# Patient Record
Sex: MALE | URBAN - METROPOLITAN AREA
[De-identification: names, ages, dates, MRNs, and addresses within clinical notes are randomized per-mention and may not be internally consistent; named-entity substitution may affect disease eponyms.]

---

## 2023-08-01 ENCOUNTER — OFFICE (OUTPATIENT)
Dept: URBAN - METROPOLITAN AREA CLINIC 112 | Facility: CLINIC | Age: 28
Setting detail: OPHTHALMOLOGY
End: 2023-08-01
Payer: MEDICAID

## 2023-08-01 DIAGNOSIS — H02.403: ICD-10-CM

## 2023-08-01 DIAGNOSIS — H52.223: ICD-10-CM

## 2023-08-01 DIAGNOSIS — H04.123: ICD-10-CM

## 2023-08-01 DIAGNOSIS — H52.13: ICD-10-CM

## 2023-08-01 PROBLEM — H04.122 DRY EYE SYNDROME LACRIMAL GLAND; RIGHT EYE, LEFT EYE, BOTH EYES: Status: ACTIVE | Noted: 2023-08-01

## 2023-08-01 PROBLEM — H04.121 DRY EYE SYNDROME LACRIMAL GLAND; RIGHT EYE, LEFT EYE, BOTH EYES: Status: ACTIVE | Noted: 2023-08-01

## 2023-08-01 PROCEDURE — 92015 DETERMINE REFRACTIVE STATE: CPT | Performed by: OPHTHALMOLOGY

## 2023-08-01 PROCEDURE — 92004 COMPRE OPH EXAM NEW PT 1/>: CPT | Performed by: OPHTHALMOLOGY

## 2023-08-01 ASSESSMENT — CONFRONTATIONAL VISUAL FIELD TEST (CVF)
OS_FINDINGS: FULL
OD_FINDINGS: FULL

## 2023-08-01 ASSESSMENT — REFRACTION_CURRENTRX
OD_VPRISM_DIRECTION: SV
OS_VPRISM_DIRECTION: SV
OS_CYLINDER: -0.50
OS_SPHERE: -0.75
OD_CYLINDER: -1.25
OD_OVR_VA: 20/
OD_AXIS: 004
OS_OVR_VA: 20/
OD_SPHERE: -0.50
OS_AXIS: 142

## 2023-08-01 ASSESSMENT — REFRACTION_AUTOREFRACTION
OS_SPHERE: -1.75
OS_AXIS: 159
OS_CYLINDER: -1.00
OD_SPHERE: -1.00
OD_CYLINDER: -1.50
OD_AXIS: 005

## 2023-08-01 ASSESSMENT — REFRACTION_MANIFEST
OS_AXIS: 160
OU_VA: 20/20
OD_AXIS: 005
OS_VA1: 20/20
OD_SPHERE: -0.75
OS_SPHERE: -1.50
OD_CYLINDER: -1.50
OD_VA1: 20/20
OS_CYLINDER: -1.00

## 2023-08-01 ASSESSMENT — VISUAL ACUITY
OS_BCVA: 20/20
OD_BCVA: 20/25+

## 2023-08-01 ASSESSMENT — SPHEQUIV_DERIVED
OD_SPHEQUIV: -1.75
OS_SPHEQUIV: -2
OD_SPHEQUIV: -1.5
OS_SPHEQUIV: -2.25

## 2023-08-01 ASSESSMENT — AXIALLENGTH_DERIVED
OD_AL: 25.4081
OS_AL: 25.636
OS_AL: 25.7516
OD_AL: 25.5215

## 2023-08-01 ASSESSMENT — KERATOMETRY
OS_K1POWER_DIOPTERS: 40.00
OD_K2POWER_DIOPTERS: 41.25
OD_K1POWER_DIOPTERS: 39.75
OD_AXISANGLE_DEGREES: 102
OS_K2POWER_DIOPTERS: 41.00
OS_AXISANGLE_DEGREES: 067

## 2023-08-01 ASSESSMENT — LID POSITION - PTOSIS
OS_PTOSIS: LUL T
OD_PTOSIS: RUL T

## 2023-08-15 ENCOUNTER — OUTPATIENT (OUTPATIENT)
Dept: OUTPATIENT SERVICES | Facility: HOSPITAL | Age: 28
LOS: 1 days | End: 2023-08-15

## 2023-08-15 VITALS
TEMPERATURE: 98 F | DIASTOLIC BLOOD PRESSURE: 79 MMHG | HEART RATE: 98 BPM | HEIGHT: 70.5 IN | OXYGEN SATURATION: 98 % | WEIGHT: 195.99 LBS | SYSTOLIC BLOOD PRESSURE: 113 MMHG | RESPIRATION RATE: 18 BRPM

## 2023-08-15 DIAGNOSIS — F20.9 SCHIZOPHRENIA, UNSPECIFIED: ICD-10-CM

## 2023-08-15 DIAGNOSIS — K05.6 PERIODONTAL DISEASE, UNSPECIFIED: ICD-10-CM

## 2023-08-15 DIAGNOSIS — F84.0 AUTISTIC DISORDER: ICD-10-CM

## 2023-08-15 DIAGNOSIS — Z98.890 OTHER SPECIFIED POSTPROCEDURAL STATES: Chronic | ICD-10-CM

## 2023-08-15 LAB
ANION GAP SERPL CALC-SCNC: 10 MMOL/L — SIGNIFICANT CHANGE UP (ref 7–14)
BUN SERPL-MCNC: 17 MG/DL — SIGNIFICANT CHANGE UP (ref 7–23)
CALCIUM SERPL-MCNC: 8.7 MG/DL — SIGNIFICANT CHANGE UP (ref 8.4–10.5)
CHLORIDE SERPL-SCNC: 105 MMOL/L — SIGNIFICANT CHANGE UP (ref 98–107)
CO2 SERPL-SCNC: 25 MMOL/L — SIGNIFICANT CHANGE UP (ref 22–31)
CREAT SERPL-MCNC: 1.2 MG/DL — SIGNIFICANT CHANGE UP (ref 0.5–1.3)
EGFR: 84 ML/MIN/1.73M2 — SIGNIFICANT CHANGE UP
GLUCOSE SERPL-MCNC: 77 MG/DL — SIGNIFICANT CHANGE UP (ref 70–99)
HCT VFR BLD CALC: 49.9 % — SIGNIFICANT CHANGE UP (ref 39–50)
HGB BLD-MCNC: 16.6 G/DL — SIGNIFICANT CHANGE UP (ref 13–17)
MCHC RBC-ENTMCNC: 31.5 PG — SIGNIFICANT CHANGE UP (ref 27–34)
MCHC RBC-ENTMCNC: 33.3 GM/DL — SIGNIFICANT CHANGE UP (ref 32–36)
MCV RBC AUTO: 94.7 FL — SIGNIFICANT CHANGE UP (ref 80–100)
NRBC # BLD: 0 /100 WBCS — SIGNIFICANT CHANGE UP (ref 0–0)
NRBC # FLD: 0 K/UL — SIGNIFICANT CHANGE UP (ref 0–0)
PLATELET # BLD AUTO: 170 K/UL — SIGNIFICANT CHANGE UP (ref 150–400)
POTASSIUM SERPL-MCNC: 4.2 MMOL/L — SIGNIFICANT CHANGE UP (ref 3.5–5.3)
POTASSIUM SERPL-SCNC: 4.2 MMOL/L — SIGNIFICANT CHANGE UP (ref 3.5–5.3)
RBC # BLD: 5.27 M/UL — SIGNIFICANT CHANGE UP (ref 4.2–5.8)
RBC # FLD: 12.7 % — SIGNIFICANT CHANGE UP (ref 10.3–14.5)
SODIUM SERPL-SCNC: 140 MMOL/L — SIGNIFICANT CHANGE UP (ref 135–145)
WBC # BLD: 7.91 K/UL — SIGNIFICANT CHANGE UP (ref 3.8–10.5)
WBC # FLD AUTO: 7.91 K/UL — SIGNIFICANT CHANGE UP (ref 3.8–10.5)

## 2023-08-15 NOTE — H&P PST ADULT - PROBLEM SELECTOR PLAN 1
Patient tentatively scheduled for Comprehensive dental treatment on 8/17/23.  Pre-op instructions provided. Pt's  given verbal and written instructions with pepcid. Verbalized understanding.   CBC, BMP were done at [facility].  Copy of Medical evaluation in chart.     Janenelaurie Richter will consent for procedure- [phone]

## 2023-08-15 NOTE — H&P PST ADULT - HISTORY OF PRESENT ILLNESS
28 year old male with pmhx of Autism, Schizophrenia, PTSD, Asthma presents for pre-op evaluation for diagnosis of Periodontal disease unspecified. Pt is scheduled for Comprehensive dental treatment.

## 2023-08-15 NOTE — H&P PST ADULT - NEGATIVE LYMPHATIC SYMPTOMS
no enlarged lymph nodes/no tender lymph nodes/no swelling of extremity Sotyktu Pregnancy And Lactation Text: There is insufficient data to evaluate whether or not Sotyktu is safe to use during pregnancy.   It is not known if Sotyktu passes into breast milk and whether or not it is safe to use when breastfeeding.

## 2023-08-15 NOTE — H&P PST ADULT - NEGATIVE ENMT SYMPTOMS
Denies dentures. Denies loose teeth./no hearing difficulty/no ear pain/no vertigo/no sinus symptoms/no nasal congestion/no nose bleeds/no dry mouth/no throat pain/no dysphagia

## 2023-08-15 NOTE — H&P PST ADULT - NSICDXPASTMEDICALHX_GEN_ALL_CORE_FT
PAST MEDICAL HISTORY:  Asthma     Astigmatism     Autism     H/O myopia     Periodontal disease, unspecified     Post traumatic stress disorder     Schizophrenia     Tardive dyskinesia

## 2023-08-15 NOTE — H&P PST ADULT - NEGATIVE PSYCHIATRIC SYMPTOMS
no suicidal ideation/no depression/no anxiety/no insomnia/no mood swings/no visual hallucinations/no auditory hallucinations/no hyperactivity

## 2023-08-15 NOTE — H&P PST ADULT - NEGATIVE GENERAL GENITOURINARY SYMPTOMS
no hematuria/no flank pain L/no flank pain R/no incontinence/no dysuria/normal urinary frequency/no nocturia

## 2023-08-15 NOTE — H&P PST ADULT - NSANTHOSAYNRD_GEN_A_CORE
No. TESSIE screening performed.  STOP BANG Legend: 0-2 = LOW Risk; 3-4 = INTERMEDIATE Risk; 5-8 = HIGH Risk

## 2023-11-19 PROBLEM — K05.6 PERIODONTAL DISEASE, UNSPECIFIED: Chronic | Status: ACTIVE | Noted: 2023-08-15

## 2023-11-19 PROBLEM — G24.01 DRUG INDUCED SUBACUTE DYSKINESIA: Chronic | Status: ACTIVE | Noted: 2023-08-15

## 2023-11-19 PROBLEM — F20.9 SCHIZOPHRENIA, UNSPECIFIED: Chronic | Status: ACTIVE | Noted: 2023-08-15

## 2023-11-19 PROBLEM — Z86.69 PERSONAL HISTORY OF OTHER DISEASES OF THE NERVOUS SYSTEM AND SENSE ORGANS: Chronic | Status: ACTIVE | Noted: 2023-08-15

## 2023-11-19 PROBLEM — F43.10 POST-TRAUMATIC STRESS DISORDER, UNSPECIFIED: Chronic | Status: ACTIVE | Noted: 2023-08-15

## 2023-11-19 PROBLEM — H52.209 UNSPECIFIED ASTIGMATISM, UNSPECIFIED EYE: Chronic | Status: ACTIVE | Noted: 2023-08-15

## 2023-11-19 PROBLEM — J45.909 UNSPECIFIED ASTHMA, UNCOMPLICATED: Chronic | Status: ACTIVE | Noted: 2023-08-15

## 2023-11-19 PROBLEM — F84.0 AUTISTIC DISORDER: Chronic | Status: ACTIVE | Noted: 2023-08-15

## 2024-01-25 ENCOUNTER — OUTPATIENT (OUTPATIENT)
Dept: OUTPATIENT SERVICES | Facility: HOSPITAL | Age: 29
LOS: 1 days | End: 2024-01-25

## 2024-01-25 VITALS
SYSTOLIC BLOOD PRESSURE: 122 MMHG | RESPIRATION RATE: 16 BRPM | OXYGEN SATURATION: 98 % | DIASTOLIC BLOOD PRESSURE: 85 MMHG | HEART RATE: 98 BPM | TEMPERATURE: 98 F | WEIGHT: 192.02 LBS | HEIGHT: 70 IN

## 2024-01-25 DIAGNOSIS — K02.62 DENTAL CARIES ON SMOOTH SURFACE PENETRATING INTO DENTIN: ICD-10-CM

## 2024-01-25 DIAGNOSIS — Z98.890 OTHER SPECIFIED POSTPROCEDURAL STATES: Chronic | ICD-10-CM

## 2024-01-25 NOTE — H&P PST ADULT - FUNCTIONAL STATUS
DASI METS 9.89 able dance, play basetball, soccer/4-10 METS DASI METS 9.89 able dance, play basketball, soccer

## 2024-01-25 NOTE — H&P PST ADULT - PROBLEM SELECTOR PLAN 2
Instructed for patient to take Clozapine and Divalproex- DR with a sip of water on the morning of procedure.

## 2024-01-25 NOTE — H&P PST ADULT - HEIGHT IN INCHES
Carac Counseling:  I discussed with the patient the risks of Carac including but not limited to erythema, scaling, itching, weeping, crusting, and pain. 10

## 2024-01-25 NOTE — H&P PST ADULT - HISTORY OF PRESENT ILLNESS
29 year old male with pmhx of Autism, Schizophrenia, PTSD, Asthma presents for pre-op evaluation for diagnosis of Periodontal disease unspecified. Pt is scheduled for Comprehensive dental treatment.

## 2024-01-25 NOTE — H&P PST ADULT - NSCAFFEAMTFREQ_GEN_ALL_CORE_SD
Message  Return to work or school:   Wilfrido Zaman is under my professional care  She was seen in my office on 4/3/17     She is able to return to school on 4/4/17     [facility] CRNP  Signatures   Electronically signed by : KELL PATHAK;  Apr  3 2017  8:35AM EST                       (Author)    Electronically signed by : Mandeep Bearden MD; May 30 2017  7:53PM EST                       (Author)
occasional use

## 2024-01-25 NOTE — H&P PST ADULT - PROBLEM SELECTOR PLAN 1
Patient tentatively scheduled for Comprehensive dental treatment on 8/17/23.  Pre-op instructions provided. Pt's  given verbal and written instructions with pepcid. Verbalized understanding.   CBC, BMP were done at [facility].  Copy of Medical evaluation in chart.     Janenelaurie Richter will consent for procedure- [phone] Patient tentatively scheduled for Comprehensive dental treatment on 2/02/2024   Pre-op instructions provided. Pt's  given verbal and written instructions with pepcid. Verbalized understanding.   Recent labs in chart   Copy of Medical evaluation in chart.    Consent by [employer] (Legal document in chart)

## 2024-01-25 NOTE — H&P PST ADULT - ENMT COMMENTS
pre-op dx: Periodontal disease unspecified. pre-op dx: Periodontal disease unspecified, poor dentition

## 2024-01-25 NOTE — H&P PST ADULT - NS PRO AD NO ADVANCE DIRECTIVE
----- Message from Kayla Melo MD sent at 2/10/2023  1:38 PM CST -----  Please schedule RCS and salpingectomy with Dr Benavides on 3/23.  Please check with Dr Benavides first   No

## 2024-01-25 NOTE — H&P PST ADULT - NEGATIVE ENMT SYMPTOMS
Pt. Denies dentures & Denies loose teeth./no hearing difficulty/no ear pain/no vertigo/no sinus symptoms/no nasal congestion/no nose bleeds/no dry mouth/no throat pain/no dysphagia

## 2024-01-25 NOTE — H&P PST ADULT - NEGATIVE SKIN SYMPTOMS
Pt discharged home per provider in stable condition. Paperwork provided to pt via RN and discharge instructions went over with by RN - pt verbalized understanding of teaching with no questions or concerns and is A/Ox4, VSS. Paperwork in hand on discharge.    Paperwork given to pt and gone over with by this RN - no new prescriptions. Pt verbalized understanding of teaching with no questions or concerns. Paperwork in hand on discharge. Ambulatory out with all belongings in hand accompanied by boyfriend.   no rash/no itching/no dryness

## 2024-02-01 NOTE — ASU PATIENT PROFILE, ADULT - FALL HARM RISK - UNIVERSAL INTERVENTIONS
Bed in lowest position, wheels locked, appropriate side rails in place/Call bell, personal items and telephone in reach/Instruct patient to call for assistance before getting out of bed or chair/Non-slip footwear when patient is out of bed/[place] to call system/Physically safe environment - no spills, clutter or unnecessary equipment/Purposeful Proactive Rounding/Room/bathroom lighting operational, light cord in reach

## 2024-02-02 ENCOUNTER — TRANSCRIPTION ENCOUNTER (OUTPATIENT)
Age: 29
End: 2024-02-02

## 2024-02-02 ENCOUNTER — OUTPATIENT (OUTPATIENT)
Dept: OUTPATIENT SERVICES | Facility: HOSPITAL | Age: 29
LOS: 1 days | Discharge: HOME | End: 2024-02-02

## 2024-02-02 VITALS
SYSTOLIC BLOOD PRESSURE: 139 MMHG | OXYGEN SATURATION: 98 % | RESPIRATION RATE: 18 BRPM | HEART RATE: 83 BPM | DIASTOLIC BLOOD PRESSURE: 96 MMHG

## 2024-02-02 VITALS
TEMPERATURE: 98 F | RESPIRATION RATE: 14 BRPM | OXYGEN SATURATION: 99 % | HEIGHT: 70 IN | SYSTOLIC BLOOD PRESSURE: 125 MMHG | WEIGHT: 192.02 LBS | HEART RATE: 84 BPM | DIASTOLIC BLOOD PRESSURE: 78 MMHG

## 2024-02-02 DIAGNOSIS — Z98.890 OTHER SPECIFIED POSTPROCEDURAL STATES: Chronic | ICD-10-CM

## 2024-02-02 DIAGNOSIS — K02.62 DENTAL CARIES ON SMOOTH SURFACE PENETRATING INTO DENTIN: ICD-10-CM

## 2024-02-02 DEVICE — SURGICEL 2 X 14": Type: IMPLANTABLE DEVICE | Status: FUNCTIONAL

## 2024-02-02 RX ORDER — FENTANYL CITRATE 50 UG/ML
50 INJECTION INTRAVENOUS ONCE
Refills: 0 | Status: DISCONTINUED | OUTPATIENT
Start: 2024-02-02 | End: 2024-02-02

## 2024-02-02 RX ORDER — FENTANYL CITRATE 50 UG/ML
25 INJECTION INTRAVENOUS
Refills: 0 | Status: DISCONTINUED | OUTPATIENT
Start: 2024-02-02 | End: 2024-02-02

## 2024-02-02 RX ORDER — ONDANSETRON 8 MG/1
4 TABLET, FILM COATED ORAL ONCE
Refills: 0 | Status: DISCONTINUED | OUTPATIENT
Start: 2024-02-02 | End: 2024-02-16

## 2024-02-02 NOTE — ASU DISCHARGE PLAN (ADULT/PEDIATRIC) - NURSING INSTRUCTIONS
DO NOT take any Tylenol (Acetaminophen) or narcotics containing Tylenol until after  9:23 pm_ . You received Tylenol during your operation and it can cause damage to your liver if too much is taken within a 24 hour time period. DO NOT take any Ibuprofen ,Advil or Aleeve until after  9:40 pm_ . You received Toradol during your operation and it can cause damage if too much is taken within a 24 hour time period.

## 2024-02-02 NOTE — ASU PREOP CHECKLIST - MEDICAL/PEDIATRIC CLEARANCE ON MEDICAL RECORD
schizophrenia, PTSD, autism  decision making committee paper in chart schizophrenia, PTSD, autism       decision making committee paper in chart             from group home

## 2024-02-02 NOTE — ASU DISCHARGE PLAN (ADULT/PEDIATRIC) - NS DC INTERPRETER YES NO
Problem: Patient Care Overview  Goal: Plan of Care/Patient Progress Review  Outcome: Therapy, progress towards functional goals is fair  VSS. Pain managed with oxycotin, oxycodone q4, and dilaudid for breakthrough pain. +Nauzea, scheduled zofran given. NPO, scrub #1 to be completed this shift. Voiding spontaneously, pt independent with ostomy cares. Pt up ad juan. Continue current plan of care.        No

## 2024-02-02 NOTE — ASU DISCHARGE PLAN (ADULT/PEDIATRIC) - NS MD DC FALL RISK RISK
For information on Fall & Injury Prevention, visit: [email or web address] OR  [email or web address] OR  https://www.cdc.gov/steadi/patient.html

## 2024-04-25 ENCOUNTER — OUTPATIENT (OUTPATIENT)
Dept: OUTPATIENT SERVICES | Facility: HOSPITAL | Age: 29
LOS: 1 days | End: 2024-04-25

## 2024-04-25 VITALS
OXYGEN SATURATION: 97 % | DIASTOLIC BLOOD PRESSURE: 65 MMHG | SYSTOLIC BLOOD PRESSURE: 106 MMHG | TEMPERATURE: 98 F | HEART RATE: 85 BPM | HEIGHT: 72 IN | RESPIRATION RATE: 12 BRPM | WEIGHT: 197.98 LBS

## 2024-04-25 DIAGNOSIS — K05.6 PERIODONTAL DISEASE, UNSPECIFIED: ICD-10-CM

## 2024-04-25 DIAGNOSIS — Z01.818 ENCOUNTER FOR OTHER PREPROCEDURAL EXAMINATION: ICD-10-CM

## 2024-04-25 DIAGNOSIS — K02.9 DENTAL CARIES, UNSPECIFIED: ICD-10-CM

## 2024-04-25 DIAGNOSIS — F20.9 SCHIZOPHRENIA, UNSPECIFIED: ICD-10-CM

## 2024-04-25 DIAGNOSIS — K02.62 DENTAL CARIES ON SMOOTH SURFACE PENETRATING INTO DENTIN: ICD-10-CM

## 2024-04-25 DIAGNOSIS — Z98.890 OTHER SPECIFIED POSTPROCEDURAL STATES: Chronic | ICD-10-CM

## 2024-04-25 LAB
ANION GAP SERPL CALC-SCNC: 11 MMOL/L — SIGNIFICANT CHANGE UP (ref 5–17)
BUN SERPL-MCNC: 12 MG/DL — SIGNIFICANT CHANGE UP (ref 7–23)
CALCIUM SERPL-MCNC: 9.6 MG/DL — SIGNIFICANT CHANGE UP (ref 8.4–10.5)
CHLORIDE SERPL-SCNC: 105 MMOL/L — SIGNIFICANT CHANGE UP (ref 96–108)
CO2 SERPL-SCNC: 24 MMOL/L — SIGNIFICANT CHANGE UP (ref 22–31)
CREAT SERPL-MCNC: 1.15 MG/DL — SIGNIFICANT CHANGE UP (ref 0.5–1.3)
EGFR: 88 ML/MIN/1.73M2 — SIGNIFICANT CHANGE UP
GLUCOSE SERPL-MCNC: 81 MG/DL — SIGNIFICANT CHANGE UP (ref 70–99)
HCT VFR BLD CALC: 49.5 % — SIGNIFICANT CHANGE UP (ref 39–50)
HGB BLD-MCNC: 15.8 G/DL — SIGNIFICANT CHANGE UP (ref 13–17)
MCHC RBC-ENTMCNC: 30.4 PG — SIGNIFICANT CHANGE UP (ref 27–34)
MCHC RBC-ENTMCNC: 31.9 GM/DL — LOW (ref 32–36)
MCV RBC AUTO: 95.4 FL — SIGNIFICANT CHANGE UP (ref 80–100)
NRBC # BLD: 0 /100 WBCS — SIGNIFICANT CHANGE UP (ref 0–0)
PLATELET # BLD AUTO: 177 K/UL — SIGNIFICANT CHANGE UP (ref 150–400)
POTASSIUM SERPL-MCNC: 4.3 MMOL/L — SIGNIFICANT CHANGE UP (ref 3.5–5.3)
POTASSIUM SERPL-SCNC: 4.3 MMOL/L — SIGNIFICANT CHANGE UP (ref 3.5–5.3)
RBC # BLD: 5.19 M/UL — SIGNIFICANT CHANGE UP (ref 4.2–5.8)
RBC # FLD: 13 % — SIGNIFICANT CHANGE UP (ref 10.3–14.5)
SODIUM SERPL-SCNC: 140 MMOL/L — SIGNIFICANT CHANGE UP (ref 135–145)
WBC # BLD: 7.04 K/UL — SIGNIFICANT CHANGE UP (ref 3.8–10.5)
WBC # FLD AUTO: 7.04 K/UL — SIGNIFICANT CHANGE UP (ref 3.8–10.5)

## 2024-04-25 PROCEDURE — G0463: CPT

## 2024-04-25 PROCEDURE — 80048 BASIC METABOLIC PNL TOTAL CA: CPT

## 2024-04-25 PROCEDURE — 85027 COMPLETE CBC AUTOMATED: CPT

## 2024-04-25 RX ORDER — DOCUSATE SODIUM 100 MG
1 CAPSULE ORAL
Refills: 0 | DISCHARGE

## 2024-04-25 NOTE — H&P PST ADULT - PROBLEM SELECTOR PLAN 1
Comprehensive Dental Treatment Under Anesthesia on 5/15/24.  CBC and BMP done today at [facility].   Pre op instructions provided and all questions answered.

## 2024-04-25 NOTE — H&P PST ADULT - I HAVE PERSONALLY SEEN AND EXAMINED THE PATIENT. THERE HAVE NOT BEEN ANY CHANGES IN THE PATIENT'S HISTORY OR EXAM UNLESS COMMENTED BELOW
Only appt seen is 11/28/23 0830 with Dr Guerrero.  No room for pre-op/establish care prior to that.  Will message ortho to confirm need   Statement Selected

## 2024-04-25 NOTE — H&P PST ADULT - ASSESSMENT
Activity: Patient regularly plays basketball, soccer, and dances and has no symptoms.     DASI: 8.97    Mallampati: 3    Dental: Denies loose teeth or dentures.

## 2024-04-25 NOTE — H&P PST ADULT - ENMT COMMENTS
pre-op dx: Periodontal disease unspecified, poor dentition pre-op dx: Periodontal disease unspecified.

## 2024-04-25 NOTE — H&P PST ADULT - HISTORY OF PRESENT ILLNESS
29 year old male, accompanied by group home staff member and legal guardian, with PMH of Autism, Schizophrenia, PTSD, Asthma (no current treatment), who presents today to PST prior to scheduled Comprehensive Dental Treatment Under Anesthesia on 5/15/24. Patient denies recent fever, chills, chest pain, SOB, palpitations, or recent exposure to COVID-19.    Patient accompanied by legal guardian Henok Suzan [phone]. He did not bring paperwork stating that he is patient's legal guardian. Fax number for [facility] provided to Henok and asked him to fax us the paperwork ASAP.

## 2024-04-25 NOTE — H&P PST ADULT - PROBLEM SELECTOR PLAN 2
Patient and his guardian instructed to have patient take Clozapine and Divalproex on DOS with a small sip of water.

## 2024-05-15 ENCOUNTER — OUTPATIENT (OUTPATIENT)
Dept: OUTPATIENT SERVICES | Facility: HOSPITAL | Age: 29
LOS: 1 days | End: 2024-05-15
Payer: MEDICARE

## 2024-05-15 ENCOUNTER — TRANSCRIPTION ENCOUNTER (OUTPATIENT)
Age: 29
End: 2024-05-15

## 2024-05-15 VITALS
SYSTOLIC BLOOD PRESSURE: 125 MMHG | HEIGHT: 72 IN | HEART RATE: 85 BPM | TEMPERATURE: 98 F | RESPIRATION RATE: 12 BRPM | DIASTOLIC BLOOD PRESSURE: 81 MMHG | WEIGHT: 197.98 LBS | OXYGEN SATURATION: 97 %

## 2024-05-15 VITALS
DIASTOLIC BLOOD PRESSURE: 69 MMHG | HEART RATE: 88 BPM | TEMPERATURE: 97 F | OXYGEN SATURATION: 96 % | SYSTOLIC BLOOD PRESSURE: 123 MMHG | RESPIRATION RATE: 16 BRPM

## 2024-05-15 DIAGNOSIS — K02.62 DENTAL CARIES ON SMOOTH SURFACE PENETRATING INTO DENTIN: ICD-10-CM

## 2024-05-15 DIAGNOSIS — Z98.890 OTHER SPECIFIED POSTPROCEDURAL STATES: Chronic | ICD-10-CM

## 2024-05-15 DIAGNOSIS — K05.6 PERIODONTAL DISEASE, UNSPECIFIED: ICD-10-CM

## 2024-05-15 PROCEDURE — D2335: CPT

## 2024-05-15 PROCEDURE — C1889: CPT

## 2024-05-15 PROCEDURE — D4211: CPT

## 2024-05-15 PROCEDURE — D4341: CPT

## 2024-05-15 PROCEDURE — D2392: CPT

## 2024-05-15 PROCEDURE — D2332: CPT

## 2024-05-15 PROCEDURE — C9399: CPT

## 2024-05-15 PROCEDURE — D1110: CPT

## 2024-05-15 PROCEDURE — D4210: CPT

## 2024-05-15 PROCEDURE — D2394: CPT

## 2024-05-15 PROCEDURE — D7210: CPT

## 2024-05-15 DEVICE — SURGICEL 2 X 14": Type: IMPLANTABLE DEVICE | Status: FUNCTIONAL

## 2024-05-15 DEVICE — SURGIFOAM PAD 8CM X 12.5CM X 10MM (100): Type: IMPLANTABLE DEVICE | Status: FUNCTIONAL

## 2024-05-15 RX ORDER — POLYETHYLENE GLYCOL 3350 17 G/17G
0 POWDER, FOR SOLUTION ORAL
Refills: 0 | DISCHARGE

## 2024-05-15 RX ORDER — ERGOCALCIFEROL 1.25 MG/1
0 CAPSULE ORAL
Refills: 0 | DISCHARGE

## 2024-05-15 RX ORDER — ONDANSETRON 8 MG/1
4 TABLET, FILM COATED ORAL ONCE
Refills: 0 | Status: DISCONTINUED | OUTPATIENT
Start: 2024-05-15 | End: 2024-05-30

## 2024-05-15 RX ORDER — DIVALPROEX SODIUM 500 MG/1
2 TABLET, DELAYED RELEASE ORAL
Refills: 0 | DISCHARGE

## 2024-05-15 RX ORDER — ACETAMINOPHEN 500 MG
2 TABLET ORAL
Refills: 0 | DISCHARGE

## 2024-05-15 RX ORDER — CLOZAPINE 150 MG/1
1 TABLET, ORALLY DISINTEGRATING ORAL
Refills: 0 | DISCHARGE

## 2024-05-15 RX ORDER — DIVALPROEX SODIUM 500 MG/1
1 TABLET, DELAYED RELEASE ORAL
Refills: 0 | DISCHARGE

## 2024-05-15 RX ORDER — IBUPROFEN 200 MG
2 TABLET ORAL
Refills: 0 | DISCHARGE

## 2024-05-15 NOTE — ASU DISCHARGE PLAN (ADULT/PEDIATRIC) - ASU DC SPECIAL INSTRUCTIONSFT
comprehensive dental treatment under general anesthesia     cleaning, periodontal treatment and  2 hopeless  extracted on the lower anterior arch as medically necessary     see DR Shea at [facility] on 5/20  at 145 pm, appt is set    no straw for two days and keep head elevated for the next two days and nights    return to program tomorrow if patient feels well     ice as tolerated    tylenol or motrin prn pain and give tylenol for the next two days for comfort     patient was given antibiotics thru the IV today, continue oral antibiotics at [facility]

## 2024-08-21 ENCOUNTER — OFFICE (OUTPATIENT)
Dept: URBAN - METROPOLITAN AREA CLINIC 112 | Facility: CLINIC | Age: 29
Setting detail: OPHTHALMOLOGY
End: 2024-08-21
Payer: MEDICARE

## 2024-08-21 DIAGNOSIS — H04.121: ICD-10-CM

## 2024-08-21 DIAGNOSIS — H52.13: ICD-10-CM

## 2024-08-21 DIAGNOSIS — H52.223: ICD-10-CM

## 2024-08-21 DIAGNOSIS — H04.123: ICD-10-CM

## 2024-08-21 DIAGNOSIS — H02.403: ICD-10-CM

## 2024-08-21 DIAGNOSIS — H04.122: ICD-10-CM

## 2024-08-21 PROCEDURE — 92014 COMPRE OPH EXAM EST PT 1/>: CPT | Performed by: OPHTHALMOLOGY

## 2024-08-21 ASSESSMENT — CONFRONTATIONAL VISUAL FIELD TEST (CVF)
OD_FINDINGS: FULL
OS_FINDINGS: FULL

## 2024-08-21 ASSESSMENT — LID POSITION - PTOSIS
OD_PTOSIS: RUL T
OS_PTOSIS: LUL T

## 2025-08-20 ENCOUNTER — OFFICE (OUTPATIENT)
Dept: URBAN - METROPOLITAN AREA CLINIC 112 | Facility: CLINIC | Age: 30
Setting detail: OPHTHALMOLOGY
End: 2025-08-20
Payer: MEDICARE

## 2025-08-20 DIAGNOSIS — H04.121: ICD-10-CM

## 2025-08-20 DIAGNOSIS — H02.403: ICD-10-CM

## 2025-08-20 DIAGNOSIS — H04.122: ICD-10-CM

## 2025-08-20 DIAGNOSIS — H52.223: ICD-10-CM

## 2025-08-20 DIAGNOSIS — H04.123: ICD-10-CM

## 2025-08-20 DIAGNOSIS — H52.13: ICD-10-CM

## 2025-08-20 PROCEDURE — 92012 INTRM OPH EXAM EST PATIENT: CPT | Performed by: OPHTHALMOLOGY

## 2025-08-20 ASSESSMENT — VISUAL ACUITY
OD_BCVA: 20/25
OS_BCVA: 20/25

## 2025-08-20 ASSESSMENT — REFRACTION_MANIFEST
OS_AXIS: 160
OD_VA1: 20/20
OS_CYLINDER: -1.00
OD_SPHERE: -0.75
OD_AXIS: 005
OU_VA: 20/20
OS_VA1: 20/20
OS_SPHERE: -1.50
OD_CYLINDER: -1.50

## 2025-08-20 ASSESSMENT — KERATOMETRY
OD_AXISANGLE_DEGREES: 105
OS_K1POWER_DIOPTERS: 39.75
OD_K1POWER_DIOPTERS: 39.75
OS_AXISANGLE_DEGREES: 075
OS_K2POWER_DIOPTERS: 41.25
OD_K2POWER_DIOPTERS: 41.50

## 2025-08-20 ASSESSMENT — REFRACTION_AUTOREFRACTION
OS_AXIS: 175
OS_CYLINDER: -0.75
OD_SPHERE: -1.00
OD_CYLINDER: -1.25
OS_SPHERE: -1.00
OD_AXIS: 015

## 2025-08-20 ASSESSMENT — REFRACTION_CURRENTRX
OD_CYLINDER: -1.50
OS_AXIS: 160
OS_VPRISM_DIRECTION: SV
OD_AXIS: 004
OD_OVR_VA: 20/
OD_VPRISM_DIRECTION: SV
OS_VPRISM_DIRECTION: SV
OD_CYLINDER: -1.25
OD_SPHERE: -0.50
OD_OVR_VA: 20/
OS_SPHERE: -1.50
OS_AXIS: 142
OS_SPHERE: -0.75
OS_CYLINDER: -0.50
OS_OVR_VA: 20/
OD_SPHERE: -0.75
OS_CYLINDER: -1.00
OD_VPRISM_DIRECTION: SV
OD_AXIS: 005
OS_OVR_VA: 20/

## 2025-08-20 ASSESSMENT — LID POSITION - PTOSIS
OS_PTOSIS: LUL T
OD_PTOSIS: RUL T

## 2025-08-20 ASSESSMENT — TONOMETRY
OD_IOP_MMHG: 13
OS_IOP_MMHG: 15

## 2025-08-20 ASSESSMENT — CONFRONTATIONAL VISUAL FIELD TEST (CVF)
OS_FINDINGS: FULL
OD_FINDINGS: FULL

## (undated) DEVICE — SUT CHROMIC 3-0 30" C-13

## (undated) DEVICE — SOL IRR POUR NS 0.9% 500ML

## (undated) DEVICE — GLV 6 PROTEXIS (BLUE)

## (undated) DEVICE — SOL IRR POUR H2O 250ML

## (undated) DEVICE — ELCTR BOVIE PENCIL SMOKE EVACUATION

## (undated) DEVICE — SYR ASEPTO

## (undated) DEVICE — POSITIONER FOAM EGG CRATE ULNAR 2PCS (PINK)

## (undated) DEVICE — DRAPE LIGHT HANDLE COVER (GREEN)

## (undated) DEVICE — DRAPE TOWEL 1010

## (undated) DEVICE — GOWN XL

## (undated) DEVICE — ELCTR GROUNDING PAD ADULT COVIDIEN

## (undated) DEVICE — TUBING SUCTION NONCONDUCTIVE 6MM X 12FT

## (undated) DEVICE — WARMING BLANKET LOWER ADULT

## (undated) DEVICE — SPONGE END-STRUNG CYLINDRICAL .5X1.5"

## (undated) DEVICE — DRAPE ISOLATION BAG 20X20"

## (undated) DEVICE — DRAPE 3/4 SHEET 52X76"

## (undated) DEVICE — DRSG CURITY GAUZE SPONGE 4 X 4" 12-PLY

## (undated) DEVICE — DRAPE INSTRUMENT POUCH 6.75" X 11"

## (undated) DEVICE — DRAPE TOWEL BLUE 17" X 24"

## (undated) DEVICE — PACK DENTAL

## (undated) DEVICE — POSITIONER FOAM HEAD CRADLE (PINK)

## (undated) DEVICE — DRAPE SPLIT SHEET 77" X 120"

## (undated) DEVICE — BASIN SET DOUBLE

## (undated) DEVICE — VAGINAL PACKING 2"

## (undated) DEVICE — LABELS BLANK W PEN

## (undated) DEVICE — DRAPE CAMERA ENDOMATE

## (undated) DEVICE — DRAPE SURGICAL #1010

## (undated) DEVICE — DRAPE MAGNETIC INSTRUMENT MEDIUM

## (undated) DEVICE — ELCTR COLORADO 3CM

## (undated) DEVICE — VENODYNE/SCD SLEEVE CALF MEDIUM

## (undated) DEVICE — SUCTION YANKAUER NO CONTROL VENT

## (undated) DEVICE — MEDICATION LABELS W MARKER

## (undated) DEVICE — ELCTR BOVIE TIP NEEDLE INSULATED 2.8" EDGE